# Patient Record
Sex: FEMALE | Race: WHITE
[De-identification: names, ages, dates, MRNs, and addresses within clinical notes are randomized per-mention and may not be internally consistent; named-entity substitution may affect disease eponyms.]

---

## 2018-01-17 ENCOUNTER — HOSPITAL ENCOUNTER (EMERGENCY)
Dept: HOSPITAL 59 - ER | Age: 23
Discharge: HOME | End: 2018-01-17
Payer: SELF-PAY

## 2018-01-17 DIAGNOSIS — R19.7: ICD-10-CM

## 2018-01-17 DIAGNOSIS — N73.9: Primary | ICD-10-CM

## 2018-01-17 DIAGNOSIS — R11.11: ICD-10-CM

## 2018-01-17 DIAGNOSIS — R10.30: ICD-10-CM

## 2018-01-17 LAB
ALBUMIN SERPL-MCNC: 4.8 G/DL (ref 4–5)
ALP SERPL-CCNC: 52 U/L (ref 35–104)
ALT SERPL-CCNC: 8 U/L (ref ?–33)
ANION GAP SERPL CALC-SCNC: 15 MMOL/L (ref 7–16)
APPEARANCE UR: (no result)
AST SERPL-CCNC: 16 U/L (ref 10–35)
BACTERIA #/AREA URNS HPF: (no result) /[HPF]
BASOPHILS NFR BLD: 0.3 % (ref 0–6)
BILIRUB DIRECT SERPL-MCNC: < 0.2 MG/DL (ref 0–0.3)
BILIRUB SERPL-MCNC: 0.9 MG/DL (ref 0.2–1)
BILIRUB UR-MCNC: NEGATIVE MG/DL
BUN SERPL-MCNC: 10 MG/DL (ref 6–20)
CO2 SERPL-SCNC: 25 MMOL/L (ref 22–29)
COLOR UR: YELLOW
CREAT SERPL-MCNC: 0.9 MG/DL (ref 0.5–0.9)
EOSINOPHIL NFR BLD: 3.5 % (ref 0–6)
ERYTHROCYTE [DISTWIDTH] IN BLOOD BY AUTOMATED COUNT: 12.6 % (ref 11.5–14.5)
EST GLOMERULAR FILTRATION RATE: > 60 ML/MIN
GLUCOSE SERPL-MCNC: 93 MG/DL (ref 74–109)
GLUCOSE UR STRIP-MCNC: NEGATIVE MG/DL
GRANULOCYTES NFR BLD: 56.4 % (ref 47–80)
HCG,QUALITATIVE URINE: NEGATIVE
HCT VFR BLD CALC: 43 % (ref 35–47)
HGB BLD-MCNC: 14.6 GM/DL (ref 11.6–16)
KETONES UR QL STRIP: NEGATIVE
LIPASE SERPL-CCNC: 18 U/L (ref 13–60)
LYMPHOCYTES NFR BLD AUTO: 33 % (ref 16–45)
MCH RBC QN AUTO: 29.8 PG (ref 27–33)
MCHC RBC AUTO-ENTMCNC: 34 G/DL (ref 32–36)
MCV RBC AUTO: 87.8 FL (ref 81–97)
MONOCYTES NFR BLD: 6.8 % (ref 0–9)
NITRITE UR QL STRIP: NEGATIVE
PLATELET # BLD: 213 K/UL (ref 130–400)
PMV BLD AUTO: 9.8 FL (ref 7.4–10.4)
PROT SERPL-MCNC: 7.3 G/DL (ref 6.6–8.7)
PROT UR QL STRIP: NEGATIVE
RBC # BLD AUTO: 4.9 M/UL (ref 3.8–5.4)
RBC # UR STRIP: (no result) /UL
RBC #/AREA URNS HPF: (no result) /[HPF]
URINE LEUKOCYTE ESTERASE: (no result)
UROBILINOGEN UR STRIP-ACNC: 0.2 E.U./DL (ref 0.2–1)
WBC # BLD AUTO: 6.9 K/UL (ref 4.2–12.2)

## 2018-01-17 PROCEDURE — 87210 SMEAR WET MOUNT SALINE/INK: CPT

## 2018-01-17 PROCEDURE — 80076 HEPATIC FUNCTION PANEL: CPT

## 2018-01-17 PROCEDURE — 99285 EMERGENCY DEPT VISIT HI MDM: CPT

## 2018-01-17 PROCEDURE — 74176 CT ABD & PELVIS W/O CONTRAST: CPT

## 2018-01-17 PROCEDURE — 83690 ASSAY OF LIPASE: CPT

## 2018-01-17 PROCEDURE — 96374 THER/PROPH/DIAG INJ IV PUSH: CPT

## 2018-01-17 PROCEDURE — 81025 URINE PREGNANCY TEST: CPT

## 2018-01-17 PROCEDURE — 81001 URINALYSIS AUTO W/SCOPE: CPT

## 2018-01-17 PROCEDURE — 96375 TX/PRO/DX INJ NEW DRUG ADDON: CPT

## 2018-01-17 PROCEDURE — 80048 BASIC METABOLIC PNL TOTAL CA: CPT

## 2018-01-17 PROCEDURE — 85025 COMPLETE CBC W/AUTO DIFF WBC: CPT

## 2018-01-17 RX ADMIN — METRONIDAZOLE ONE MG: 250 TABLET ORAL at 19:53

## 2018-01-17 RX ADMIN — HYDROMORPHONE HYDROCHLORIDE ONE MG: 1 INJECTION, SOLUTION INTRAMUSCULAR; INTRAVENOUS; SUBCUTANEOUS at 18:11

## 2018-01-17 RX ADMIN — SODIUM CHLORIDE ONE ML: 0.9 INJECTION, SOLUTION INTRAVENOUS at 18:12

## 2018-01-17 RX ADMIN — DEXTROSE MONOHYDRATE ONE MLS/HR: 5 INJECTION, SOLUTION INTRAVENOUS at 18:12

## 2018-01-17 RX ADMIN — ONDANSETRON ONE MG: 2 INJECTION INTRAMUSCULAR; INTRAVENOUS at 19:44

## 2018-01-17 NOTE — EMERGENCY DEPARTMENT RECORD
History of Present Illness





- General


Chief Complaint: Abdominal Pain


Stated Complaint: ABDOMINAL PAIN


Time Seen by Provider: 18 15:57


Mode of Arrival: Ambulatory





- History of Present Illness


Onset/Timin


-: Month(s)


Location: Diffuse


Radiation: Back


Severity: Moderate


Quality: Cramping


Consistency: Constant


Improves With: Rest


Worsens With: Movement


Associated Symptoms: Anorexia


Treatments Prior to Arrival: NSAIDs





- Related Data


LMP (females 10-50): Unknown


Patient Pregnant: No


 Previous Rx's











 Medication  Instructions  Recorded


 


Acetaminophen with Codeine 1 each PO Q6HR #14 tablet 18





[Tylenol with Codeine #3 Tablet]  


 


Levofloxacin [Levaquin Tab] 500 mg PO DAILY #10 tab 18


 


Metronidazole [Flagyl] 500 mg PO TID #30 tablet 18


 


Ondansetron HCl [Zofran] 4 mg PO Q4HR #10 tablet 18











 Allergies











Allergy/AdvReac Type Severity Reaction Status Date / Time


 


cefaclor [From Ceclor] Allergy  HIVES Verified 18 16:04


 


acetaminophen [From Vicodin] AdvReac  VOMITING Verified 18 16:04


 


amoxicillin trihydrate AdvReac  VOMITING Verified 18 16:04





[From Augmentin]     


 


hydrocodone bitartrate AdvReac  VOMITING Verified 18 16:04





[From Vicodin]     


 


potassium clavulanate AdvReac  VOMITING Verified 18 16:04





[From Augmentin]     














Travel Screening





- Travel/Exposure Within Last 30 Days


Have you traveled within the last 30 days?: No





- Travel/Exposure Within Last Year


Have you traveled outside the [place] in the last year?: No





- Additonal Travel Details


Have you been exposed to anyone with a communicable illness?: No





- Travel Symptoms


Symptom Screening: None





Review of Systems


Constitutional: Reports: As per HPI.  Denies: Chills, Fever, Malaise, Night 

sweats, Weakness, Weight change


Eyes: Reports: As per HPI.  Denies: Eye discharge, Eye pain, Photophobia, 

Vision change


ENT: Reports: As per HPI.  Denies: Congestion, Dental pain, Ear pain, Epistaxis

, Hearing loss, Throat pain


Respiratory: Reports: As per HPI.  Denies: Cough, Dyspnea, Hemoptysis, Stridor, 

Wheezes


Cardiovascular: Reports: As per HPI.  Denies: Arrhythmia, Chest pain, Dyspnea 

on exertion, Edema, Murmurs, Orthopnea, Palpitations, Paroxysmal nocturnal 

dyspnea, Rheumatic Fever, Syncope


Endocrine: Reports: As per HPI.  Denies: Fatigue, Heat or cold intolerance, 

Polydipsia, Polyuria


Gastrointestinal: Reports: As per HPI, Abdominal pain, Diarrhea, Nausea, 

Vomiting.  Denies: Constipation, Hematemesis, Hematochezia, Melena


Genitourinary: Reports: As per HPI.  Denies: Abnormal menses, Discharge, 

Dyspareunia, Dysuria, Frequency, Hematuria, Incontinence, Retention, Urgency


Musculoskeletal: Reports: As per HPI.  Denies: Arthralgia, Back pain, Gout, 

Joint swelling, Myalgia, Neck pain


Skin: Reports: As per HPI.  Denies: Bruising, Change in color, Change in hair/

nails, Lesions, Pruritus, Rash


Neurological: Reports: As per HPI.  Denies: Abnormal gait, Confusion, Headache, 

Numbness, Paresthesias, Seizure, Tingling, Tremors, Vertigo, Weakness


Psychiatric: Reports: As per HPI.  Denies: Anxiety, Auditory hallucinations, 

Depression, Homicidal thoughts, Suicidal thoughts, Visual hallucinations


Hematological/Lymphatic: Reports: As per HPI.  Denies: Anemia, Blood Clots, 

Easy bleeding, Easy bruising, Swollen glands





Past Medical History





- SOCIAL HISTORY


Smoking Status: Never smoker


Alcohol Use: Occasional


Drug Use: None





- RESPIRATORY


Hx Respiratory Disorders: No





- CARDIOVASCULAR


Hx Cardio Disorders: No





- NEURO


Hx Neuro Disorders: No





- GI


Hx GI Disorders: No





- 


Hx Genitourinary Disorders: No





- ENDOCRINE


Hx Endocrine Disorders: No





- MUSCULOSKELETAL


Hx Musculoskeletal Disorders: No





- PSYCH


Hx Psych Problems: No





- HEMATOLOGY/ONCOLOGY


Hx Hematology/Oncology Disorders: Yes


Hx Anemia: Yes





Family Medical History


Any Significant Family History?: Yes


Family Hx Comment (NOT TO BE USED IN PLACE OF ITEMS BELOW): Mom hysterecotmy


Hx Kidney Disease: Father, Mother





Course





 Vital Signs











  18





  16:05 18:49


 


Temperature 98 F 


 


Pulse Rate 112 H 


 


Pulse Rate [  72





Pulse Ox Probe]  


 


Respiratory 20 20





Rate  


 


Blood Pressure 140/87 


 


Blood Pressure  120/69





[Right Arm]  


 


Pulse Ox 100 99














Medical Decision Making





- Lab Data


Result diagrams: 


 18 16:40





 18 16:40





 Lab Results











  01/18 Range/Units





  16:00 16:24 16:40 


 


WBC    6.9  (4.2-12.2)  K/uL


 


RBC    4.90  (3.80-5.40)  M/uL


 


Hgb    14.6  (11.6-16.0)  gm/dl


 


Hct    43.0  (35.0-47.0)  %


 


MCV    87.8  (81-97)  fl


 


MCH    29.8  (27-33)  pg


 


MCHC    34.0  (32-36)  g/dl


 


RDW    12.6  (11.5-14.5)  %


 


Plt Count    213  (130-400)  K/uL


 


MPV    9.8  (7.4-10.4)  fl


 


Gran %    56.4  (47-80)  %


 


Lymphocytes %    33.0  (16-45)  %


 


Monocytes %    6.8  (0-9)  %


 


Eosinophils %    3.5  (0-6)  %


 


Basophils %    0.3  (0-6)  %


 


Sodium     (136-145)  mmol/L


 


Potassium     (3.4-4.5)  mmol/L


 


Chloride     ()  mmol/L


 


Carbon Dioxide     (22-29)  mmol/L


 


Anion Gap     (7-16)  


 


BUN     (6-20)  mg/dL


 


Creatinine     (0.5-0.9)  mg/dL


 


Estimated GFR     mL/min


 


Random Glucose     ()  mg/dL


 


Calcium     (8.6-10.0)  mg/dL


 


Total Bilirubin     (0.2-1.0)  mg/dL


 


Direct Bilirubin     (0-0.3)  mg/dL


 


AST     (10.0-35.0)  U/L


 


ALT     (<33)  U/L


 


Alkaline Phosphatase     ()  U/L


 


Total Protein     (6.6-8.7)  g/dL


 


Albumin     (4.0-5.0)  g/dL


 


Lipase     (13-60)  U/L


 


Urine Color  Yellow  Cancelled   


 


Urine Appearance  Sl cloudy  Cancelled   


 


Urine pH  6.0  Cancelled   (5.0-8.0)  


 


Ur Specific Gravity  >= 1.030  Cancelled   (1.002-1.030)  


 


Urine Protein  Negative  Cancelled   (NEGATIVE)  


 


Urine Glucose (UA)  Negative  Cancelled   (NEGATIVE)  


 


Urine Clinitest   Cancelled   


 


Urine Ketones  Negative  Cancelled   (NEGATIVE)  


 


Urine Blood  Trace-i  Cancelled   (NEGATIVE)  


 


Urine Nitrite  Negative  Cancelled   (NEGATIVE)  


 


Urine Bilirubin  Negative  Cancelled   (NEGATIVE)  


 


Urine Ictotest   Cancelled   


 


Prot Sulfosalicylic Acd   Cancelled   


 


Urine Urobilinogen  0.2  Cancelled   (0.20 - 1.00)  E.U./dL


 


Ur Leukocyte Esterase  Trace H  Cancelled   (NEGATIVE)  


 


Urine RBC  0 - 2    (NONE SEEN)  


 


Urine WBC  3 - 5    (0-2/hpf)  


 


Ur Epithelial Cells  3 - 6    (FEW)  


 


Urine Bacteria  2+    


 


Urine HCG, Qual  Negative    (NEGATIVE)  


 


Wet Prep     (NONE SEEN)  














  18 Range/Units





  16:40 18:00 


 


WBC    (4.2-12.2)  K/uL


 


RBC    (3.80-5.40)  M/uL


 


Hgb    (11.6-16.0)  gm/dl


 


Hct    (35.0-47.0)  %


 


MCV    (81-97)  fl


 


MCH    (27-33)  pg


 


MCHC    (32-36)  g/dl


 


RDW    (11.5-14.5)  %


 


Plt Count    (130-400)  K/uL


 


MPV    (7.4-10.4)  fl


 


Gran %    (47-80)  %


 


Lymphocytes %    (16-45)  %


 


Monocytes %    (0-9)  %


 


Eosinophils %    (0-6)  %


 


Basophils %    (0-6)  %


 


Sodium  139   (136-145)  mmol/L


 


Potassium  3.8   (3.4-4.5)  mmol/L


 


Chloride  99   ()  mmol/L


 


Carbon Dioxide  25.0   (22-29)  mmol/L


 


Anion Gap  15.0   (7-16)  


 


BUN  10   (6-20)  mg/dL


 


Creatinine  0.9   (0.5-0.9)  mg/dL


 


Estimated GFR  > 60   mL/min


 


Random Glucose  93   ()  mg/dL


 


Calcium  9.7   (8.6-10.0)  mg/dL


 


Total Bilirubin  0.90   (0.2-1.0)  mg/dL


 


Direct Bilirubin  < 0.2   (0-0.3)  mg/dL


 


AST  16   (10.0-35.0)  U/L


 


ALT  8   (<33)  U/L


 


Alkaline Phosphatase  52   ()  U/L


 


Total Protein  7.3   (6.6-8.7)  g/dL


 


Albumin  4.8   (4.0-5.0)  g/dL


 


Lipase  18   (13-60)  U/L


 


Urine Color    


 


Urine Appearance    


 


Urine pH    (5.0-8.0)  


 


Ur Specific Gravity    (1.002-1.030)  


 


Urine Protein    (NEGATIVE)  


 


Urine Glucose (UA)    (NEGATIVE)  


 


Urine Clinitest    


 


Urine Ketones    (NEGATIVE)  


 


Urine Blood    (NEGATIVE)  


 


Urine Nitrite    (NEGATIVE)  


 


Urine Bilirubin    (NEGATIVE)  


 


Urine Ictotest    


 


Prot Sulfosalicylic Acd    


 


Urine Urobilinogen    (0.20 - 1.00)  E.U./dL


 


Ur Leukocyte Esterase    (NEGATIVE)  


 


Urine RBC    (NONE SEEN)  


 


Urine WBC    (0-2/hpf)  


 


Ur Epithelial Cells    (FEW)  


 


Urine Bacteria    


 


Urine HCG, Qual    (NEGATIVE)  


 


Wet Prep   No trich or yeast  (NONE SEEN)  














Disposition


Clinical Impression: 


 PID (acute pelvic inflammatory disease)





Abdominal pain


Qualifiers:


 Abdominal location: lower abdomen, unspecified Qualified Code(s): R10.30 - 

Lower abdominal pain, unspecified





Disposition: Home, Self-Care


Condition: (1) Good


Instructions:  Pelvic Inflammatory Disease (ED)


Additional Instructions: 


follow up with family  in 2-6 days


return if worse


Prescriptions: 


Ondansetron HCl [Zofran] 4 mg PO Q4HR #10 tablet


Acetaminophen with Codeine [Tylenol with Codeine #3 Tablet] 1 each PO Q6HR #14 

tablet


Levofloxacin [Levaquin Tab] 500 mg PO DAILY #10 tab


Metronidazole [Flagyl] 500 mg PO TID #30 tablet


Forms:  Patient Portal Access


Time of Disposition: 19:37





Quality





- Quality Measures


Quality Measures: N/A





- Blood Pressure Screening


Does Patient Have Any of the Following: No


Blood Pressure Classification: Pre-Hypertensive BP Reading


Systolic Measurement: 140


Diastolic Measurement: 87


Screening for High Blood Pressure: < Pre-Hypertensive BP, F/U Documented > [

]


Pre-Hypertensive Follow-up Interventions: Referral to alternative/primary care 

provider.

## 2018-01-17 NOTE — EMERGENCY DEPARTMENT RECORD
History of Present Illness





- General


Chief Complaint: Abdominal Pain


Stated Complaint: ABDOMINAL PAIN


Time Seen by Provider: 18 15:57


Mode of Arrival: Ambulatory





- History of Present Illness


Initial Comments: 


abdominal pain bilateral and vomiting and diarrhea and glucose 104 used her 

grandmother's glucose machine. PSH appendectomy.  





Onset/Timin


-: Month(s)


Location: Diffuse


Radiation: Back


Severity: Moderate


Quality: Cramping


Consistency: Constant


Improves With: Rest


Worsens With: Movement


Associated Symptoms: Anorexia


Treatments Prior to Arrival: NSAIDs





- Related Data


LMP (females 10-50): Unknown


Patient Pregnant: No


 Previous Rx's











 Medication  Instructions  Recorded


 


Acetaminophen with Codeine 1 each PO Q6HR #14 tablet 18





[Tylenol with Codeine #3 Tablet]  


 


Levofloxacin [Levaquin Tab] 500 mg PO DAILY #10 tab 18


 


Metronidazole [Flagyl] 500 mg PO TID #30 tablet 18











 Allergies











Allergy/AdvReac Type Severity Reaction Status Date / Time


 


cefaclor [From Ceclor] Allergy  HIVES Verified 18 16:04


 


acetaminophen [From Vicodin] AdvReac  VOMITING Verified 18 16:04


 


amoxicillin trihydrate AdvReac  VOMITING Verified 18 16:04





[From Augmentin]     


 


hydrocodone bitartrate AdvReac  VOMITING Verified 18 16:04





[From Vicodin]     


 


potassium clavulanate AdvReac  VOMITING Verified 18 16:04





[From Augmentin]     














Travel Screening





- Travel/Exposure Within Last 30 Days


Have you traveled within the last 30 days?: No





- Travel/Exposure Within Last Year


Have you traveled outside the [place] in the last year?: No





- Additonal Travel Details


Have you been exposed to anyone with a communicable illness?: No





- Travel Symptoms


Symptom Screening: None





Review of Systems


Reviewed: No additional complaints except as noted below


Constitutional: Reports: As per HPI.  Denies: Chills, Fever, Malaise, Night 

sweats, Weakness, Weight change


Eyes: Reports: As per HPI.  Denies: Eye discharge, Eye pain, Photophobia, 

Vision change


ENT: Reports: As per HPI.  Denies: Congestion, Dental pain, Ear pain, Epistaxis

, Hearing loss, Throat pain


Respiratory: Reports: As per HPI.  Denies: Cough, Dyspnea, Hemoptysis, Stridor, 

Wheezes


Cardiovascular: Reports: As per HPI.  Denies: Arrhythmia, Chest pain, Dyspnea 

on exertion, Edema, Murmurs, Orthopnea, Palpitations, Paroxysmal nocturnal 

dyspnea, Rheumatic Fever, Syncope


Endocrine: Reports: As per HPI.  Denies: Fatigue, Heat or cold intolerance, 

Polydipsia, Polyuria


Gastrointestinal: Reports: As per HPI, Abdominal pain, Diarrhea, Nausea, 

Vomiting.  Denies: Constipation, Hematemesis, Hematochezia, Melena


Genitourinary: Reports: As per HPI.  Denies: Abnormal menses, Discharge, 

Dyspareunia, Dysuria, Frequency, Hematuria, Incontinence, Retention, Urgency


Musculoskeletal: Reports: As per HPI.  Denies: Arthralgia, Back pain, Gout, 

Joint swelling, Myalgia, Neck pain


Skin: Reports: As per HPI.  Denies: Bruising, Change in color, Change in hair/

nails, Lesions, Pruritus, Rash


Neurological: Reports: As per HPI.  Denies: Abnormal gait, Confusion, Headache, 

Numbness, Paresthesias, Seizure, Tingling, Tremors, Vertigo, Weakness


Psychiatric: Reports: As per HPI.  Denies: Anxiety, Auditory hallucinations, 

Depression, Homicidal thoughts, Suicidal thoughts, Visual hallucinations


Hematological/Lymphatic: Reports: As per HPI.  Denies: Anemia, Blood Clots, 

Easy bleeding, Easy bruising, Swollen glands





Past Medical History





- SOCIAL HISTORY


Smoking Status: Never smoker


Alcohol Use: Occasional


Drug Use: None





- RESPIRATORY


Hx Respiratory Disorders: No





- CARDIOVASCULAR


Hx Cardio Disorders: No





- NEURO


Hx Neuro Disorders: No





- GI


Hx GI Disorders: No





- 


Hx Genitourinary Disorders: No





- ENDOCRINE


Hx Endocrine Disorders: No





- MUSCULOSKELETAL


Hx Musculoskeletal Disorders: No





- PSYCH


Hx Psych Problems: No





- HEMATOLOGY/ONCOLOGY


Hx Hematology/Oncology Disorders: Yes


Hx Anemia: Yes





Family Medical History


Any Significant Family History?: Yes


Family Hx Comment (NOT TO BE USED IN PLACE OF ITEMS BELOW): Mom hysterecotmy


Hx Kidney Disease: Father, Mother





Physical Exam





- General


General Appearance: Alert, Oriented x3, Cooperative, No acute distress





- Head


Head exam: Normal inspection





- Eye


Eye exam: Normal appearance, PERRL


Pupils: Normal accommodation





- ENT


ENT exam: Normal exam, Mucous membranes moist, Normal external ear exam, Normal 

orophraynx, TM's normal bilaterally


Ear exam: Normal external inspection.  negative: External canal tenderness


Nasal Exam: Normal inspection.  negative: Discharge, Sinus tenderness


Mouth exam: Normal external inspection, Tongue normal


Teeth exam: Normal inspection.  negative: Dental caries


Throat exam: Normal inspection.  negative: Tonsillar erythema, Tonsillar exudate





- Neck


Neck exam: Normal inspection, Full ROM.  negative: Tenderness





- Respiratory


Respiratory exam: Normal lung sounds bilaterally.  negative: Respiratory 

distress





- Cardiovascular


Cardiovascular Exam: Regular rate, Normal rhythm, Normal heart sounds





- GI/Abdominal


GI/Abdominal exam: Soft, Normal bowel sounds, Tenderness (abdominal pain right 

worse than left )





- Rectal


Rectal exam: Deferred





- 


 exam: Adnexal tenderness (L), Adnexal tenderness (R), Cervical discharge, 

cervical motion tenderness, Normal external exam, Vaginal discharge.  negative: 

Abnormal external exam, Adnexal mass (L), Adnexal mass (R), Enlarged uterus





- Extremities


Extremities exam: Normal inspection, Full ROM, Normal capillary refill.  

negative: Tenderness





- Back


Back exam: Reports: Normal inspection, Full ROM.  Denies: Muscle spasm, Rash 

noted, Tenderness





- Neurological


Neurological exam: Alert, Normal gait, Oriented X3, Reflexes normal





- Psychiatric


Psychiatric exam: Normal affect, Normal mood





- Skin


Skin exam: Dry, Intact, Normal color, Warm





Course





 Vital Signs











  18





  16:05


 


Temperature 98 F


 


Pulse Rate 112 H


 


Respiratory 20





Rate 


 


Blood Pressure 140/87


 


Pulse Ox 100














- Reevaluation(s)


Reevaluation #1: 


dilaudid given after pelvix esam


18 17:58





Reevaluation #2: 


patient is feeling better and will treat outpatient.


18 19:30








Medical Decision Making





- Data Complexity


MDM Data: Labs Ordered and/or Reviewed, X-Ray Ordered and/or Reviewed (CT of 

abdomin and pelvis neg )





- Lab Data


Result diagrams: 


 18 16:40





 18 16:40





Disposition


Clinical Impression: 


 PID (acute pelvic inflammatory disease)





Abdominal pain


Qualifiers:


 Abdominal location: lower abdomen, unspecified Qualified Code(s): R10.30 - 

Lower abdominal pain, unspecified





Disposition: Home, Self-Care


Condition: (1) Good


Instructions:  Pelvic Inflammatory Disease (ED)


Additional Instructions: 


follow up with family  in 2-6 days


return if worse


Prescriptions: 


Acetaminophen with Codeine [Tylenol with Codeine #3 Tablet] 1 each PO Q6HR #14 

tablet


Levofloxacin [Levaquin Tab] 500 mg PO DAILY #10 tab


Metronidazole [Flagyl] 500 mg PO TID #30 tablet


Forms:  Patient Portal Access


Time of Disposition: 19:24





Quality





- Quality Measures


Quality Measures: N/A





- Blood Pressure Screening


Does Patient Have Any of the Following: No


Blood Pressure Classification: Pre-Hypertensive BP Reading


Systolic Measurement: 140


Diastolic Measurement: 87


Screening for High Blood Pressure: < Pre-Hypertensive BP, F/U Documented > [

]


Pre-Hypertensive Follow-up Interventions: Referral to alternative/primary care 

provider.

## 2018-01-18 NOTE — CT SCAN REPORT
EXAM:  CT OF THE ABDOMEN AND PELVIS WITHOUT CONTRAST 



HISTORY:  WORSENING MENSTRUAL CRAMPS OVER THE PAST SEVERAL MONTHS.  



TECHNIQUE:  Routine helical CT examination of the abdomen and pelvis was 
performed without oral or intravenous contrast administration.  Lack of oral 
and IV contrast utilization limits evaluation of the bowel and solid viscera 
respectively.  



Comparison:  CT of the abdomen and pelvis without contrast dated 9/06/16.  



FINDINGS:  The lung bases are clear.  No pleural or pericardial effusion.  The 
heart is not enlarged.  



The liver, spleen, pancreas, and adrenal glands are without focal abnormality.  
The spleen is at the upper limits of normal in size, stable.  



The gallbladder is unremarkable.  No biliary ductal dilatation is seen.  No 
intraabdominal nor retroperitoneal lymphadenopathy is present.  As demonstrated 
on the prior examination, there are several nonenlarged lymph nodes in the 
central mesentery.  



The kidneys are normal in size, position, and are smoothly marginated.  No 
nephrolithiasis nor renal mass.  The renal collecting systems are normal in 
caliber and there is no evidence of ureteral calculus.  



No focal urinary bladder abnormality is seen though evaluation is somewhat 
limited by incomplete distention.  



No new pelvic mass nor adenopathy.  There are likely small follicles within the 
ovaries.  There is a small amount of free fluid in the cul-de-sac.  This is 
nonspecific, but likely physiologic.  



No gross bowel dilatation nor bowel wall thickening.  Post appendectomy changes 
redemonstrated.  



IMPRESSION:  

1.  SMALL AMOUNT OF FREE FLUID IN THE CUL-DE-SAC IS NONSPECIFIC, BUT LIKELY 
PHYSIOLOGIC.  



2.  STATUS POST APPENDECTOMY.  



3.  STABLE BORDERLINE SPLENOMEGALY.   THE EXAMINATION IS OTHERWISE NEGATIVE.  



JOB NUMBER:  [id number] 
[facility]